# Patient Record
(demographics unavailable — no encounter records)

---

## 2025-01-08 NOTE — PHYSICAL EXAM
[No Acute Distress] : no acute distress [Well Nourished] : well nourished [PERRL] : pupils equal round and reactive to light [EOMI] : extraocular movements intact [No JVD] : no jugular venous distention [No Respiratory Distress] : no respiratory distress  [No Accessory Muscle Use] : no accessory muscle use [Clear to Auscultation] : lungs were clear to auscultation bilaterally [Normal Rate] : normal rate  [Regular Rhythm] : with a regular rhythm [Normal S1, S2] : normal S1 and S2 [No Murmur] : no murmur heard [No Edema] : there was no peripheral edema [Soft] : abdomen soft [Non Tender] : non-tender [No CVA Tenderness] : no CVA  tenderness [No Joint Swelling] : no joint swelling [No Rash] : no rash [Coordination Grossly Intact] : coordination grossly intact [No Focal Deficits] : no focal deficits

## 2025-01-08 NOTE — HISTORY OF PRESENT ILLNESS
[FreeTextEntry1] : cpe [de-identified] : 51M PMH of ETOH abuse, mild asthma, previous issues with ejaculation (on tadalafil) presenting for CPE.  Only big health update was he briefly had issue with urination, was briefly on flomax which resolved it. No longer on flomax. And no longer has issues with urination.  Asthma well controlled, last flare many months ago, hasnt had to use it in months. No wheezing noted.   Would like refills of medications. (Would like sent to [facility])  HCM  Colonoscopy October 2023 - c scope next in 8 years, had a 2mm polyp in the sigmoid colon Immunizations - not interested in a flu shot today, however due for t dap and is open to getting it today Diet and exercise - when the weather is better bikes everywhere, eats and cooks at home, lots of veggies/lean meat Open to getting routine blood work No drug / etoh/ smoking use

## 2025-01-08 NOTE — REVIEW OF SYSTEMS
[Fever] : no fever [Chills] : no chills [Night Sweats] : no night sweats [Discharge] : no discharge [Earache] : no earache [Chest Pain] : no chest pain [Shortness Of Breath] : no shortness of breath [Wheezing] : no wheezing [Cough] : no cough [Abdominal Pain] : no abdominal pain [Headache] : no headache [Memory Loss] : no memory loss [Suicidal] : not suicidal [Easy Bleeding] : no easy bleeding

## 2025-01-08 NOTE — HISTORY OF PRESENT ILLNESS
[FreeTextEntry1] : cpe [de-identified] : 51M PMH of ETOH abuse, mild asthma, previous issues with ejaculation (on tadalafil) presenting for CPE.  Only big health update was he briefly had issue with urination, was briefly on flomax which resolved it. No longer on flomax. And no longer has issues with urination.  Asthma well controlled, last flare many months ago, hasnt had to use it in months. No wheezing noted.   Would like refills of medications. (Would like sent to [facility])  HCM  Colonoscopy October 2023 - c scope next in 8 years, had a 2mm polyp in the sigmoid colon Immunizations - not interested in a flu shot today, however due for t dap and is open to getting it today Diet and exercise - when the weather is better bikes everywhere, eats and cooks at home, lots of veggies/lean meat Open to getting routine blood work No drug / etoh/ smoking use

## 2025-06-11 NOTE — HEALTH RISK ASSESSMENT
[Little interest or pleasure doing things] : 1) Little interest or pleasure doing things [Feeling down, depressed, or hopeless] : 2) Feeling down, depressed, or hopeless [0] : 2) Feeling down, depressed, or hopeless: Not at all (0) [PHQ-9 Negative - No further assessment needed] : PHQ-9 Negative - No further assessment needed [Never] : Never [PRU2Moenh] : 0

## 2025-06-11 NOTE — END OF VISIT
[] : Resident [FreeTextEntry3] : #tick bite- had  engorged tick removed last week. - been 6 days so more than 72 hours. discused out of window for lyme disease prophylaxis. he'll monitor that skin site for development of a rash. if present will let us know to evaluate and treat for lyme rash. no rash present today  #asthma- rare use of albuterol inhaler. can cont for now. consider changing to symbicort later if symptoms worse  #erectile dysfucntion- continue med

## 2025-06-11 NOTE — ASSESSMENT
[FreeTextEntry1] : #Tick Bite Patient bitten by [place] tick [place] 6 days prior to visit, engorged and present for 24 hours. Out of window of ppx as meets only 4/5 criteria.  - Monitoring for rash instructed, will treat for lyme if symptomatic   #HCM - Refilled albuterol and tadalafil Case d/w Dr Hebert

## 2025-06-11 NOTE — HISTORY OF PRESENT ILLNESS
[FreeTextEntry1] : pt here for follow up- tick bite [de-identified] : 52 with PMH of asthma presenting for tick bite check. He was hiking in [place] last week and found a tick on him, he thought it was a mole initially. It was buried in him for at least 24 hours., It was engorged already when he found  it. He has no allergies to medications.  He looked up the tick and it was a deer tick. He has had no fevers, chills, arthralgias, rash, chest pain, shortness of breath. He has never had a tick bite before.

## 2025-06-11 NOTE — PHYSICAL EXAM
[No Acute Distress] : no acute distress [No Respiratory Distress] : no respiratory distress  [No Accessory Muscle Use] : no accessory muscle use [Normal Rate] : normal rate  [Regular Rhythm] : with a regular rhythm [Soft] : abdomen soft [No Rash] : no rash